# Patient Record
Sex: FEMALE
[De-identification: names, ages, dates, MRNs, and addresses within clinical notes are randomized per-mention and may not be internally consistent; named-entity substitution may affect disease eponyms.]

---

## 2022-03-18 ENCOUNTER — APPOINTMENT (OUTPATIENT)
Dept: OTOLARYNGOLOGY | Facility: CLINIC | Age: 32
End: 2022-03-18
Payer: COMMERCIAL

## 2022-03-18 VITALS
WEIGHT: 105 LBS | SYSTOLIC BLOOD PRESSURE: 120 MMHG | HEIGHT: 60 IN | DIASTOLIC BLOOD PRESSURE: 88 MMHG | BODY MASS INDEX: 20.62 KG/M2 | HEART RATE: 87 BPM

## 2022-03-18 DIAGNOSIS — Z00.00 ENCOUNTER FOR GENERAL ADULT MEDICAL EXAMINATION W/OUT ABNORMAL FINDINGS: ICD-10-CM

## 2022-03-18 DIAGNOSIS — Z78.9 OTHER SPECIFIED HEALTH STATUS: ICD-10-CM

## 2022-03-18 PROCEDURE — 99204 OFFICE O/P NEW MOD 45 MIN: CPT | Mod: 25

## 2022-03-18 RX ORDER — NAPROXEN 500 MG/1
TABLET ORAL
Refills: 0 | Status: ACTIVE | COMMUNITY

## 2022-03-18 NOTE — PROCEDURE
[Image(s) Captured] : image(s) captured and filed [Video Captured] : video captured and filed [Topical Lidocaine] : topical lidocaine [Oxymetazoline HCl] : oxymetazoline HCl [Flexible Endoscope] : examined with the flexible endoscope [Serial Number: ___] : Serial Number: [unfilled] [FreeTextEntry1] : Sterile right upper lip lesion [FreeTextEntry2] : Lesion previously the inside of the lip and resulted in hard firm fibrous scar tissue band/nodule [FreeTextEntry3] : Patient placed exam chair and reclining position. Inside of the lip cleaned topically. The nodule was infiltrated with 0.15 cc of Kenalog. No bleeding\par \par NDC 4710-4204-88\par Lot [id number]\par Exp. Mar 2023

## 2022-03-18 NOTE — HISTORY OF PRESENT ILLNESS
[de-identified] : 31 year old female referred by Dr Praveen Clarke for upper lip repair \par Reports cut to the inside of the upper lip occurred about 3 weeks ago after a fall to the face. States wound has closed but extra skin noted at the area. Denies recent pain, bleeding, swelling. \par

## 2022-03-18 NOTE — CONSULT LETTER
[Dear  ___] : Dear  [unfilled], [Consult Letter:] : I had the pleasure of evaluating your patient, [unfilled]. [Please see my note below.] : Please see my note below. [Consult Closing:] : Thank you very much for allowing me to participate in the care of this patient.  If you have any questions, please do not hesitate to contact me. [Sincerely,] : Sincerely, [FreeTextEntry2] :  Dr Praveen Clarke [FreeTextEntry3] : Song Gomez MD, FLETCHER, FACS\par \par  Department Otolaryngology\par \par Director of [facility]\par \par Professor of Otolaryngology,\par \par [facility] [employer]/[employer]

## 2022-03-18 NOTE — REASON FOR VISIT
[Initial Consultation] : an initial consultation for [FreeTextEntry2] : referred by Dr Praveen Clarke for upper lip repair

## 2022-03-18 NOTE — PHYSICAL EXAM
[Midline] : trachea located in midline position [Normal] : no rashes [FreeTextEntry1] : Lesion on the upper right inside of lip following trauma [de-identified] : Lesion upper right inside of lymph hard nodule following trauma.